# Patient Record
Sex: MALE | Race: WHITE
[De-identification: names, ages, dates, MRNs, and addresses within clinical notes are randomized per-mention and may not be internally consistent; named-entity substitution may affect disease eponyms.]

---

## 2018-04-28 ENCOUNTER — HOSPITAL ENCOUNTER (EMERGENCY)
Dept: HOSPITAL 68 - ERH | Age: 53
LOS: 1 days | End: 2018-04-29
Payer: COMMERCIAL

## 2018-04-28 VITALS — HEIGHT: 65 IN | WEIGHT: 180 LBS | BODY MASS INDEX: 29.99 KG/M2

## 2018-04-28 DIAGNOSIS — I10: ICD-10-CM

## 2018-04-28 DIAGNOSIS — E11.649: Primary | ICD-10-CM

## 2018-04-28 LAB
ABSOLUTE GRANULOCYTE CT: 9.2 /CUMM (ref 1.4–6.5)
BASOPHILS # BLD: 0 /CUMM (ref 0–0.2)
BASOPHILS NFR BLD: 0.3 % (ref 0–2)
EOSINOPHIL # BLD: 0.2 /CUMM (ref 0–0.7)
EOSINOPHIL NFR BLD: 1.7 % (ref 0–5)
ERYTHROCYTE [DISTWIDTH] IN BLOOD BY AUTOMATED COUNT: 14.6 % (ref 11.5–14.5)
GRANULOCYTES NFR BLD: 82.5 % (ref 42.2–75.2)
HCT VFR BLD CALC: 46.1 % (ref 42–52)
LYMPHOCYTES # BLD: 1.1 /CUMM (ref 1.2–3.4)
MCH RBC QN AUTO: 30 PG (ref 27–31)
MCHC RBC AUTO-ENTMCNC: 33.2 G/DL (ref 33–37)
MCV RBC AUTO: 90.4 FL (ref 80–94)
MONOCYTES # BLD: 0.6 /CUMM (ref 0.1–0.6)
PLATELET # BLD: 256 /CUMM (ref 130–400)
PMV BLD AUTO: 9.1 FL (ref 7.4–10.4)
RED BLOOD CELL CT: 5.1 /CUMM (ref 4.7–6.1)
WBC # BLD AUTO: 11.1 /CUMM (ref 4.8–10.8)
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
## 2018-04-29 VITALS — SYSTOLIC BLOOD PRESSURE: 132 MMHG | DIASTOLIC BLOOD PRESSURE: 68 MMHG
